# Patient Record
Sex: FEMALE | Race: BLACK OR AFRICAN AMERICAN | NOT HISPANIC OR LATINO | URBAN - METROPOLITAN AREA
[De-identification: names, ages, dates, MRNs, and addresses within clinical notes are randomized per-mention and may not be internally consistent; named-entity substitution may affect disease eponyms.]

---
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
## 2021-10-27 NOTE — ED PROVIDER NOTE - CROS ED CONS ALL NEG
-- [phone]  Shukri Evangelista MD [place], [place], [place]  Secure Text  [phone] ACC CALLER NAME: GUS GARIMA REQUESTED PHYSICIAN: POORNIMA BOYKIN RE: CRISSYALEXIS WALLACEDO PATIENT 2014 PATIENT PCP: POORNIMA BOYKIN IF RX, PHARMACY #: [phone] CRISSY PAINTING [phone] THANK YOU ACC RN EDUARDAPR     negative...

## 2024-10-01 ENCOUNTER — NON-APPOINTMENT (OUTPATIENT)
Age: 24
End: 2024-10-01